# Patient Record
(demographics unavailable — no encounter records)

---

## 2024-10-15 NOTE — HISTORY OF PRESENT ILLNESS
[de-identified] :   CC: Hypertension follow-up. HPI: This is a 86 year female being seen for evaluation of:   Hypertension: Patient is taking her medication properly and is not having any side effects.  Patient feels that she has become frail.  Patient's  is in hospice care, has difficulty swallowing and needs to be spoon-fed. - Patient herself has been feeling more fragile in the last six months, has fallen twice and hit her head both times. - Patient has been experiencing pain in her joints and has lost five inches in height.

## 2025-04-15 NOTE — HEALTH RISK ASSESSMENT
[Fair] :  ~his/her~ mood as fair [No falls in past year] : Patient reported no falls in the past year [Little interest or pleasure doing things] : 1) Little interest or pleasure doing things [Feeling down, depressed, or hopeless] : 2) Feeling down, depressed, or hopeless [0] : 2) Feeling down, depressed, or hopeless: Not at all (0) [PHQ-2 Negative - No further assessment needed] : PHQ-2 Negative - No further assessment needed [No] : does not take [Yes] : Reviewed medication list for presence of high-risk medications. [Never] : Never [NO] : No [None] : None [] :  [Feels Safe at Home] : Feels safe at home [Fully functional (bathing, dressing, toileting, transferring, walking, feeding)] : Fully functional (bathing, dressing, toileting, transferring, walking, feeding) [Fully functional (using the telephone, shopping, preparing meals, housekeeping, doing laundry, using] : Fully functional and needs no help or supervision to perform IADLs (using the telephone, shopping, preparing meals, housekeeping, doing laundry, using transportation, managing medications and managing finances) [Reports changes in hearing] : Reports changes in hearing [Smoke Detector] : smoke detector [Seat Belt] :  uses seat belt [With Patient/Caregiver] : , with patient/caregiver [Name: ___] : Health Care Proxy's Name: [unfilled]  [Relationship: ___] : Relationship: [unfilled] [Audit-CScore] : 0 [de-identified] : [place] [[id number]] : 0 [Change in mental status noted] : No change in mental status noted [Language] : denies difficulty with language [Sexually Active] : not sexually active [High Risk Behavior] : no high risk behavior [Reports changes in vision] : Reports no changes in vision [Reports changes in dental health] : Reports no changes in dental health [AdvancecareDate] : 04/25

## 2025-04-15 NOTE — PLAN
[FreeTextEntry1] : This is a 87 year -old  F who was here today for an annual preventative physical.  A history, relevant physical examination and Health Risk Assessment were performed.     Cognitive Issues: No  Fall Risk:              No Substance Abuse screening was negative.   The following recommendations were made: Exercise regularly. Maintain a healthy diet. Immunizations were reviewed. Yearly Flu shot, was recommended. CRC screening is no longer needed.   _____________________________________ I discussed with the patient the idea of starting a low-dose statin because of the finding that she has bilateral calcifications in her carotid arteries.  The patient is concerned that this may lead to a stroke.  I explained that the only potential treatment we had is to start her on low-dose rosuvastatin 5 mg a day.  In addition, I will have the patient get a carotid duplex study to make sure that there is not any evidence of other significant stenosis in either carotid artery. On physical exam there is no carotid bruits which is a good thing. I will get a CBC, CMP, lipid profile and TSH today.

## 2025-04-15 NOTE — REVIEW OF SYSTEMS
[Fever] : no fever [Chills] : no chills [Chest Pain] : no chest pain [Palpitations] : no palpitations [Abdominal Pain] : no abdominal pain [Nausea] : no nausea

## 2025-04-15 NOTE — HISTORY OF PRESENT ILLNESS
[de-identified] : This is annual Preventative examination for this 87 year old White female.  A complete evaluation of their current medication was reviewed with them including OTC medication.   Chronic medical problems for which they are being followed by a physician are:  ASCVD     Exercises regularly: Yes   .

## 2025-04-15 NOTE — HISTORY OF PRESENT ILLNESS
[de-identified] : This is annual Preventative examination for this 87 year old White female.  A complete evaluation of their current medication was reviewed with them including OTC medication.   Chronic medical problems for which they are being followed by a physician are:  ASCVD     Exercises regularly: Yes   .

## 2025-04-15 NOTE — HEALTH RISK ASSESSMENT
[Fair] :  ~his/her~ mood as fair [No falls in past year] : Patient reported no falls in the past year [Little interest or pleasure doing things] : 1) Little interest or pleasure doing things [Feeling down, depressed, or hopeless] : 2) Feeling down, depressed, or hopeless [0] : 2) Feeling down, depressed, or hopeless: Not at all (0) [PHQ-2 Negative - No further assessment needed] : PHQ-2 Negative - No further assessment needed [No] : does not take [Yes] : Reviewed medication list for presence of high-risk medications. [Never] : Never [NO] : No [None] : None [] :  [Feels Safe at Home] : Feels safe at home [Fully functional (bathing, dressing, toileting, transferring, walking, feeding)] : Fully functional (bathing, dressing, toileting, transferring, walking, feeding) [Fully functional (using the telephone, shopping, preparing meals, housekeeping, doing laundry, using] : Fully functional and needs no help or supervision to perform IADLs (using the telephone, shopping, preparing meals, housekeeping, doing laundry, using transportation, managing medications and managing finances) [Reports changes in hearing] : Reports changes in hearing [Smoke Detector] : smoke detector [Seat Belt] :  uses seat belt [With Patient/Caregiver] : , with patient/caregiver [Name: ___] : Health Care Proxy's Name: [unfilled]  [Relationship: ___] : Relationship: [unfilled] [Audit-CScore] : 0 [de-identified] : [place] [[id number]] : 0 [Change in mental status noted] : No change in mental status noted [Language] : denies difficulty with language [Sexually Active] : not sexually active [High Risk Behavior] : no high risk behavior [Reports changes in vision] : Reports no changes in vision [Reports changes in dental health] : Reports no changes in dental health [AdvancecareDate] : 04/25

## 2025-04-15 NOTE — PHYSICAL EXAM
[No Acute Distress] : no acute distress [Well Nourished] : well nourished [Normal Sclera/Conjunctiva] : normal sclera/conjunctiva [No JVD] : no jugular venous distention [No Respiratory Distress] : no respiratory distress  [No Accessory Muscle Use] : no accessory muscle use [Normal Rate] : normal rate  [Regular Rhythm] : with a regular rhythm [Soft] : abdomen soft [Non Tender] : non-tender [Speech Grossly Normal] : speech grossly normal [Normal Mood] : the mood was normal

## 2025-07-14 NOTE — ASSESSMENT
[FreeTextEntry1] : Hypertension: Well controlled. Continue current medication: Losartan 100 mg Low salt diet. CBC, CMP done today  Hyperlipidemia: Continue Rosuvastatin 5 mg/d Lipid profile today  Encouraged to stay off Celebrex  Total Time: Face to Face and reviewing records on the day of visit = 30 minutes.

## 2025-07-14 NOTE — HISTORY OF PRESENT ILLNESS
[de-identified] :   CC: Hypertension follow-up. HPI: This is a 87 year female being seen today for evaluation of:   Hypertension: She is not having any side effects, and she is taking medication properly.